# Patient Record
Sex: MALE | Race: WHITE | NOT HISPANIC OR LATINO | URBAN - METROPOLITAN AREA
[De-identification: names, ages, dates, MRNs, and addresses within clinical notes are randomized per-mention and may not be internally consistent; named-entity substitution may affect disease eponyms.]

---

## 2021-01-08 ENCOUNTER — EMERGENCY (EMERGENCY)
Facility: HOSPITAL | Age: 36
LOS: 1 days | Discharge: HOME | End: 2021-01-08
Attending: EMERGENCY MEDICINE | Admitting: EMERGENCY MEDICINE
Payer: COMMERCIAL

## 2021-01-08 VITALS
RESPIRATION RATE: 18 BRPM | OXYGEN SATURATION: 98 % | HEART RATE: 77 BPM | TEMPERATURE: 98 F | DIASTOLIC BLOOD PRESSURE: 80 MMHG | SYSTOLIC BLOOD PRESSURE: 124 MMHG

## 2021-01-08 DIAGNOSIS — Z20.822 CONTACT WITH AND (SUSPECTED) EXPOSURE TO COVID-19: ICD-10-CM

## 2021-01-08 PROCEDURE — 99283 EMERGENCY DEPT VISIT LOW MDM: CPT

## 2021-01-08 NOTE — ED PROVIDER NOTE - CLINICAL SUMMARY MEDICAL DECISION MAKING FREE TEXT BOX
asymptomatic covid testing    discussed w/ pt regarding rpt testing and self quarantine guidelines given potential window of incubation period

## 2021-01-08 NOTE — ED PROVIDER NOTE - OBJECTIVE STATEMENT
35 yom pw requesting covid testing.  denies any fc/cough/sob/abd pain/nvd/skin rash.  no other complaints.  confirmed exposure at work.

## 2021-01-08 NOTE — ED PROVIDER NOTE - PATIENT PORTAL LINK FT
You can access the FollowMyHealth Patient Portal offered by [facility] by registering at the following website: [email or web address]. By joining [employer]’s FollowMyHealth portal, you will also be able to view your health information using other applications (apps) compatible with our system.

## 2021-01-08 NOTE — ED PROVIDER NOTE - PHYSICAL EXAMINATION
GEN: Awake, alert, non-toxic appearing, NCAT  EYES: full EOMI,  ENT: External inspection normal, normal voice,   HEAD: atraumatic  NECK: FROM neck,   MSK: forrester x 4,

## 2021-01-08 NOTE — ED PROVIDER NOTE - NSFOLLOWUPINSTRUCTIONS_ED_ALL_ED_FT
THE COVID 19 TEST   - results may take up to 5-7 days to become available   - if your result is positive, you will receive a phone call from one of our coronavirus specialists   - negative result may not be communicated on time due to the sheer volume of testing from the ER. If you haven't heard from us within 5 days, you can check [facility] [employer] [facility] or call [phone] (please do not call the ER for results)    Please continue to self quarantine (home isolation) until your result is back and following instructions accordingly  - positive: complete home isolation for a total of 14 days since day of testing and no more fever with feeling back to baseline   - negative: you will be able to stop home isolation but still practice standard precautions, similar to how you would manage a regular flu/cold.  It may take 5-7 days on average (but can take up to 14 days) to develop covid symptoms if you were exposed.  covid test also has a false negative rate (meaning you could have the covid virus but the test is negative)    Return to ER for any worsening symptoms, such as persistent fever >100.4F, shortness of breath, coughing up bloody sputum, chest pain, lethargy, and fainting    Please remember to wash your hands frequently, avoid touching your face, and cover your cough/sneeze